# Patient Record
Sex: FEMALE | Race: OTHER | HISPANIC OR LATINO | URBAN - METROPOLITAN AREA
[De-identification: names, ages, dates, MRNs, and addresses within clinical notes are randomized per-mention and may not be internally consistent; named-entity substitution may affect disease eponyms.]

---

## 2017-03-29 ENCOUNTER — INPATIENT (INPATIENT)
Facility: HOSPITAL | Age: 42
LOS: 2 days | Discharge: HOME | End: 2017-04-01
Attending: OBSTETRICS & GYNECOLOGY | Admitting: OBSTETRICS & GYNECOLOGY
Payer: COMMERCIAL

## 2017-03-29 LAB
BASOPHILS NFR BLD AUTO: 0.3 % — SIGNIFICANT CHANGE UP (ref 0–2)
EOSINOPHIL NFR BLD AUTO: 1.7 % — SIGNIFICANT CHANGE UP (ref 0–6)
HCT VFR BLD CALC: 31.9 % — LOW (ref 34.5–45)
HGB BLD-MCNC: 10.6 G/DL — LOW (ref 11.5–15.5)
LYMPHOCYTES # BLD AUTO: 21.8 % — SIGNIFICANT CHANGE UP (ref 13–44)
MCHC RBC-ENTMCNC: 29.9 PG — SIGNIFICANT CHANGE UP (ref 27–34)
MCHC RBC-ENTMCNC: 33.2 G/DL — SIGNIFICANT CHANGE UP (ref 32–36)
MCV RBC AUTO: 90.1 FL — SIGNIFICANT CHANGE UP (ref 80–100)
MONOCYTES NFR BLD AUTO: 6 % — SIGNIFICANT CHANGE UP (ref 2–14)
NEUTROPHILS NFR BLD AUTO: 70.2 % — SIGNIFICANT CHANGE UP (ref 43–77)
PLATELET # BLD AUTO: 168 K/UL — SIGNIFICANT CHANGE UP (ref 150–400)
RBC # BLD: 3.54 M/UL — LOW (ref 3.8–5.2)
RBC # FLD: 15.6 % — SIGNIFICANT CHANGE UP (ref 10.3–16.9)
WBC # BLD: 6 K/UL — SIGNIFICANT CHANGE UP (ref 3.8–10.5)
WBC # FLD AUTO: 6 K/UL — SIGNIFICANT CHANGE UP (ref 3.8–10.5)

## 2017-03-29 RX ORDER — SODIUM CHLORIDE 9 MG/ML
1000 INJECTION, SOLUTION INTRAVENOUS
Qty: 0 | Refills: 0 | Status: DISCONTINUED | OUTPATIENT
Start: 2017-03-29 | End: 2017-03-30

## 2017-03-29 RX ORDER — SODIUM CHLORIDE 9 MG/ML
1000 INJECTION, SOLUTION INTRAVENOUS ONCE
Qty: 0 | Refills: 0 | Status: COMPLETED | OUTPATIENT
Start: 2017-03-29 | End: 2017-03-30

## 2017-03-29 RX ORDER — OXYTOCIN 10 UNIT/ML
333.33 VIAL (ML) INJECTION
Qty: 20 | Refills: 0 | Status: COMPLETED | OUTPATIENT
Start: 2017-03-29

## 2017-03-29 RX ORDER — OXYTOCIN 10 UNIT/ML
1 VIAL (ML) INJECTION
Qty: 30 | Refills: 0 | Status: DISCONTINUED | OUTPATIENT
Start: 2017-03-29 | End: 2017-04-01

## 2017-03-29 RX ORDER — CITRIC ACID/SODIUM CITRATE 300-500 MG
15 SOLUTION, ORAL ORAL EVERY 4 HOURS
Qty: 0 | Refills: 0 | Status: DISCONTINUED | OUTPATIENT
Start: 2017-03-29 | End: 2017-03-30

## 2017-03-30 VITALS — WEIGHT: 211.64 LBS | HEIGHT: 68 IN

## 2017-03-30 LAB
BLD GP AB SCN SERPL QL: NEGATIVE — SIGNIFICANT CHANGE UP
RH IG SCN BLD-IMP: POSITIVE — SIGNIFICANT CHANGE UP
T PALLIDUM AB TITR SER: NEGATIVE — SIGNIFICANT CHANGE UP

## 2017-03-30 RX ORDER — IBUPROFEN 200 MG
600 TABLET ORAL EVERY 6 HOURS
Qty: 0 | Refills: 0 | Status: DISCONTINUED | OUTPATIENT
Start: 2017-03-30 | End: 2017-04-01

## 2017-03-30 RX ORDER — HYDROCORTISONE 1 %
1 OINTMENT (GRAM) TOPICAL EVERY 4 HOURS
Qty: 0 | Refills: 0 | Status: DISCONTINUED | OUTPATIENT
Start: 2017-03-30 | End: 2017-04-01

## 2017-03-30 RX ORDER — DIBUCAINE 1 %
1 OINTMENT (GRAM) RECTAL EVERY 4 HOURS
Qty: 0 | Refills: 0 | Status: DISCONTINUED | OUTPATIENT
Start: 2017-03-30 | End: 2017-04-01

## 2017-03-30 RX ORDER — PRAMOXINE HYDROCHLORIDE 150 MG/15G
1 AEROSOL, FOAM RECTAL EVERY 4 HOURS
Qty: 0 | Refills: 0 | Status: DISCONTINUED | OUTPATIENT
Start: 2017-03-30 | End: 2017-04-01

## 2017-03-30 RX ORDER — ACETAMINOPHEN 500 MG
650 TABLET ORAL EVERY 6 HOURS
Qty: 0 | Refills: 0 | Status: DISCONTINUED | OUTPATIENT
Start: 2017-03-30 | End: 2017-04-01

## 2017-03-30 RX ORDER — MAGNESIUM HYDROXIDE 400 MG/1
30 TABLET, CHEWABLE ORAL
Qty: 0 | Refills: 0 | Status: DISCONTINUED | OUTPATIENT
Start: 2017-03-30 | End: 2017-04-01

## 2017-03-30 RX ORDER — GLYCERIN ADULT
1 SUPPOSITORY, RECTAL RECTAL AT BEDTIME
Qty: 0 | Refills: 0 | Status: DISCONTINUED | OUTPATIENT
Start: 2017-03-30 | End: 2017-04-01

## 2017-03-30 RX ORDER — OXYTOCIN 10 UNIT/ML
41.67 VIAL (ML) INJECTION
Qty: 20 | Refills: 0 | Status: DISCONTINUED | OUTPATIENT
Start: 2017-03-30 | End: 2017-04-01

## 2017-03-30 RX ORDER — DIPHENHYDRAMINE HCL 50 MG
25 CAPSULE ORAL EVERY 6 HOURS
Qty: 0 | Refills: 0 | Status: DISCONTINUED | OUTPATIENT
Start: 2017-03-30 | End: 2017-04-01

## 2017-03-30 RX ORDER — DOCUSATE SODIUM 100 MG
100 CAPSULE ORAL
Qty: 0 | Refills: 0 | Status: DISCONTINUED | OUTPATIENT
Start: 2017-03-30 | End: 2017-04-01

## 2017-03-30 RX ORDER — AER TRAVELER 0.5 G/1
1 SOLUTION RECTAL; TOPICAL EVERY 4 HOURS
Qty: 0 | Refills: 0 | Status: DISCONTINUED | OUTPATIENT
Start: 2017-03-30 | End: 2017-04-01

## 2017-03-30 RX ORDER — TETANUS TOXOID, REDUCED DIPHTHERIA TOXOID AND ACELLULAR PERTUSSIS VACCINE, ADSORBED 5; 2.5; 8; 8; 2.5 [IU]/.5ML; [IU]/.5ML; UG/.5ML; UG/.5ML; UG/.5ML
0.5 SUSPENSION INTRAMUSCULAR ONCE
Qty: 0 | Refills: 0 | Status: COMPLETED | OUTPATIENT
Start: 2017-03-30 | End: 2018-02-26

## 2017-03-30 RX ORDER — SIMETHICONE 80 MG/1
80 TABLET, CHEWABLE ORAL EVERY 6 HOURS
Qty: 0 | Refills: 0 | Status: DISCONTINUED | OUTPATIENT
Start: 2017-03-30 | End: 2017-04-01

## 2017-03-30 RX ORDER — OXYTOCIN 10 UNIT/ML
333.33 VIAL (ML) INJECTION
Qty: 20 | Refills: 0 | Status: COMPLETED | OUTPATIENT
Start: 2017-03-30 | End: 2017-03-30

## 2017-03-30 RX ORDER — LANOLIN
1 OINTMENT (GRAM) TOPICAL EVERY 6 HOURS
Qty: 0 | Refills: 0 | Status: DISCONTINUED | OUTPATIENT
Start: 2017-03-30 | End: 2017-04-01

## 2017-03-30 RX ORDER — SODIUM CHLORIDE 9 MG/ML
3 INJECTION INTRAMUSCULAR; INTRAVENOUS; SUBCUTANEOUS EVERY 8 HOURS
Qty: 0 | Refills: 0 | Status: DISCONTINUED | OUTPATIENT
Start: 2017-03-30 | End: 2017-04-01

## 2017-03-30 RX ADMIN — Medication 1000 MILLIUNIT(S)/MIN: at 12:52

## 2017-03-30 RX ADMIN — Medication 1 MILLIUNIT(S)/MIN: at 00:05

## 2017-03-30 RX ADMIN — SODIUM CHLORIDE 125 MILLILITER(S): 9 INJECTION, SOLUTION INTRAVENOUS at 00:05

## 2017-03-30 RX ADMIN — Medication 100 MILLIGRAM(S): at 22:10

## 2017-03-30 RX ADMIN — Medication 600 MILLIGRAM(S): at 22:10

## 2017-03-30 RX ADMIN — Medication 600 MILLIGRAM(S): at 23:00

## 2017-03-30 RX ADMIN — SODIUM CHLORIDE 3 MILLILITER(S): 9 INJECTION INTRAMUSCULAR; INTRAVENOUS; SUBCUTANEOUS at 22:04

## 2017-03-30 RX ADMIN — Medication 600 MILLIGRAM(S): at 16:07

## 2017-03-30 RX ADMIN — SODIUM CHLORIDE 125 MILLILITER(S): 9 INJECTION, SOLUTION INTRAVENOUS at 11:39

## 2017-03-30 RX ADMIN — SODIUM CHLORIDE 2000 MILLILITER(S): 9 INJECTION, SOLUTION INTRAVENOUS at 04:04

## 2017-03-31 RX ADMIN — Medication 600 MILLIGRAM(S): at 12:14

## 2017-03-31 RX ADMIN — Medication 1 TABLET(S): at 12:14

## 2017-03-31 RX ADMIN — Medication 600 MILLIGRAM(S): at 22:00

## 2017-03-31 RX ADMIN — PRAMOXINE HYDROCHLORIDE 1 APPLICATION(S): 150 AEROSOL, FOAM RECTAL at 01:12

## 2017-03-31 RX ADMIN — Medication 600 MILLIGRAM(S): at 06:45

## 2017-03-31 RX ADMIN — SIMETHICONE 80 MILLIGRAM(S): 80 TABLET, CHEWABLE ORAL at 21:38

## 2017-03-31 RX ADMIN — Medication 650 MILLIGRAM(S): at 01:12

## 2017-03-31 RX ADMIN — Medication 600 MILLIGRAM(S): at 12:30

## 2017-03-31 RX ADMIN — Medication 650 MILLIGRAM(S): at 02:05

## 2017-03-31 RX ADMIN — Medication 100 MILLIGRAM(S): at 21:33

## 2017-03-31 RX ADMIN — SODIUM CHLORIDE 3 MILLILITER(S): 9 INJECTION INTRAMUSCULAR; INTRAVENOUS; SUBCUTANEOUS at 05:58

## 2017-03-31 RX ADMIN — Medication 100 MILLIGRAM(S): at 12:17

## 2017-03-31 RX ADMIN — SODIUM CHLORIDE 3 MILLILITER(S): 9 INJECTION INTRAMUSCULAR; INTRAVENOUS; SUBCUTANEOUS at 21:30

## 2017-03-31 RX ADMIN — Medication 600 MILLIGRAM(S): at 05:55

## 2017-03-31 RX ADMIN — Medication 600 MILLIGRAM(S): at 21:33

## 2017-03-31 RX ADMIN — Medication 1 APPLICATION(S): at 21:33

## 2017-03-31 NOTE — PROGRESS NOTE ADULT - ASSESSMENT
A/P yo 41y s/p , PPD #1, stable  1. Pain: Motrin prn  2. GI: Reg diet  3. Encourage breastfeeding  4. DVT prophylaxis: early ambulation  5. Dispo: PP2

## 2017-03-31 NOTE — PROGRESS NOTE ADULT - SUBJECTIVE AND OBJECTIVE BOX
Patient evaluated at bedside.   She reports pain is well controlled with motrin.  She denies headache, dizziness, chest pain, palpitations, shortness of breathe, nausea, vomiting, heavy vaginal bleeding or perineal discomfort.  She has been ambulating without assistance, voiding spontaneously, and is breastfeeding.    Physical Exam:  Vital Signs Last 24 Hrs  T(C): 36.8, Max: 36.8 (03-30 @ 21:56)  T(F): 98.2, Max: 98.3 (03-30 @ 21:56)  HR: 73 (58 - 82)  BP: 97/62 (94/48 - 113/57)  BP(mean): --  RR: 18 (16 - 20)  SpO2: 97% (97% - 100%)    GA: NAD, A+0 x 3  CV: RRR  Pulm: CTAB  Breasts: soft, nontender, no palpable masses  Abd: + BS, soft, nontender, nondistended, no rebound or guarding, uterus firm at midline, at the umbilicus  : lochia WNL  Extremities: no swelling or calf tenderness, reflexes +2 bilaterally                          10.6   6.0   )-----------( 168      ( 29 Mar 2017 23:27 )             31.9

## 2017-04-01 ENCOUNTER — TRANSCRIPTION ENCOUNTER (OUTPATIENT)
Age: 42
End: 2017-04-01

## 2017-04-01 VITALS
OXYGEN SATURATION: 97 % | SYSTOLIC BLOOD PRESSURE: 105 MMHG | RESPIRATION RATE: 16 BRPM | TEMPERATURE: 98 F | DIASTOLIC BLOOD PRESSURE: 67 MMHG | HEART RATE: 82 BPM

## 2017-04-01 PROCEDURE — 86900 BLOOD TYPING SEROLOGIC ABO: CPT

## 2017-04-01 PROCEDURE — 36415 COLL VENOUS BLD VENIPUNCTURE: CPT

## 2017-04-01 PROCEDURE — 90715 TDAP VACCINE 7 YRS/> IM: CPT

## 2017-04-01 PROCEDURE — 86901 BLOOD TYPING SEROLOGIC RH(D): CPT

## 2017-04-01 PROCEDURE — 85025 COMPLETE CBC W/AUTO DIFF WBC: CPT

## 2017-04-01 PROCEDURE — 86850 RBC ANTIBODY SCREEN: CPT

## 2017-04-01 PROCEDURE — 86780 TREPONEMA PALLIDUM: CPT

## 2017-04-01 RX ORDER — TETANUS TOXOID, REDUCED DIPHTHERIA TOXOID AND ACELLULAR PERTUSSIS VACCINE, ADSORBED 5; 2.5; 8; 8; 2.5 [IU]/.5ML; [IU]/.5ML; UG/.5ML; UG/.5ML; UG/.5ML
0.5 SUSPENSION INTRAMUSCULAR ONCE
Qty: 0 | Refills: 0 | Status: COMPLETED | OUTPATIENT
Start: 2017-04-01 | End: 2017-04-01

## 2017-04-01 RX ADMIN — Medication 600 MILLIGRAM(S): at 11:55

## 2017-04-01 RX ADMIN — Medication 100 MILLIGRAM(S): at 05:51

## 2017-04-01 RX ADMIN — Medication 100 MILLIGRAM(S): at 11:55

## 2017-04-01 RX ADMIN — TETANUS TOXOID, REDUCED DIPHTHERIA TOXOID AND ACELLULAR PERTUSSIS VACCINE, ADSORBED 0.5 MILLILITER(S): 5; 2.5; 8; 8; 2.5 SUSPENSION INTRAMUSCULAR at 15:53

## 2017-04-01 RX ADMIN — Medication 600 MILLIGRAM(S): at 12:50

## 2017-04-01 RX ADMIN — SODIUM CHLORIDE 3 MILLILITER(S): 9 INJECTION INTRAMUSCULAR; INTRAVENOUS; SUBCUTANEOUS at 05:43

## 2017-04-01 RX ADMIN — Medication 1 SUPPOSITORY(S): at 05:51

## 2017-04-01 RX ADMIN — Medication 600 MILLIGRAM(S): at 06:50

## 2017-04-01 RX ADMIN — Medication 600 MILLIGRAM(S): at 05:51

## 2017-04-01 RX ADMIN — MAGNESIUM HYDROXIDE 30 MILLILITER(S): 400 TABLET, CHEWABLE ORAL at 05:51

## 2017-04-01 RX ADMIN — SODIUM CHLORIDE 3 MILLILITER(S): 9 INJECTION INTRAMUSCULAR; INTRAVENOUS; SUBCUTANEOUS at 14:11

## 2017-04-01 RX ADMIN — Medication 1 TABLET(S): at 11:55

## 2017-04-01 NOTE — DISCHARGE NOTE ADULT - CARE PROVIDER_API CALL
Mike Garcia), Obstetrics and Gynecology  [address] [place] [address]  Phone: [phone]  Fax: [phone]

## 2017-04-01 NOTE — DISCHARGE NOTE ADULT - PATIENT PORTAL LINK FT
“You can access the FollowHealth Patient Portal, offered by [facility], by registering with the following website: [email or web address]”

## 2017-04-01 NOTE — DISCHARGE NOTE OB - CARE PROVIDER_API CALL
Mike Garcia), Obstetrics and Gynecology  [address] [place] [address]  Phone: [phone]  Fax: [phone]

## 2017-04-01 NOTE — DISCHARGE NOTE ADULT - HOSPITAL COURSE
Patient admitted to L&D for vaginal delivery.  Intrapartum course uneventful.  Postpartum course uncomplicated.  Discharged home on PP Day #2.

## 2017-04-01 NOTE — DISCHARGE NOTE OB - PATIENT PORTAL LINK FT
“You can access the FollowHealth Patient Portal, offered by [facility], by registering with the following website: [email or web address]”

## 2017-04-01 NOTE — DISCHARGE NOTE ADULT - CARE PLAN
Principal Discharge DX:	Vaginal delivery  Goal:	ambulate, breastfeed, regular diet  Instructions for follow-up, activity and diet:	enjoy baby.

## 2017-04-03 DIAGNOSIS — Z3A.40 40 WEEKS GESTATION OF PREGNANCY: ICD-10-CM

## 2021-08-28 NOTE — PATIENT PROFILE OB - MEDICAL/SURG HX
Dr Rocio Montgomery notified via perfect serve that patient's blood sugar is 373 For Medical Surgical Hx obtained at Admission, Please see Provider H&P

## 2022-10-07 ENCOUNTER — EMERGENCY (EMERGENCY)
Facility: HOSPITAL | Age: 47
LOS: 1 days | Discharge: HOME | End: 2022-10-07
Attending: STUDENT IN AN ORGANIZED HEALTH CARE EDUCATION/TRAINING PROGRAM | Admitting: STUDENT IN AN ORGANIZED HEALTH CARE EDUCATION/TRAINING PROGRAM
Payer: COMMERCIAL

## 2022-10-07 VITALS
HEART RATE: 67 BPM | SYSTOLIC BLOOD PRESSURE: 103 MMHG | RESPIRATION RATE: 18 BRPM | DIASTOLIC BLOOD PRESSURE: 68 MMHG | TEMPERATURE: 98 F | OXYGEN SATURATION: 99 %

## 2022-10-07 VITALS
SYSTOLIC BLOOD PRESSURE: 105 MMHG | TEMPERATURE: 98 F | HEART RATE: 83 BPM | HEIGHT: 68 IN | OXYGEN SATURATION: 98 % | RESPIRATION RATE: 18 BRPM | DIASTOLIC BLOOD PRESSURE: 70 MMHG | WEIGHT: 160.06 LBS

## 2022-10-07 LAB
ALBUMIN SERPL ELPH-MCNC: 4.5 G/DL — SIGNIFICANT CHANGE UP (ref 3.3–5)
ALP SERPL-CCNC: 74 U/L — SIGNIFICANT CHANGE UP (ref 40–120)
ALT FLD-CCNC: 22 U/L — SIGNIFICANT CHANGE UP (ref 10–45)
ANION GAP SERPL CALC-SCNC: 13 MMOL/L — SIGNIFICANT CHANGE UP (ref 5–17)
APPEARANCE UR: ABNORMAL
APTT BLD: 28.8 SEC — SIGNIFICANT CHANGE UP (ref 27.5–35.5)
AST SERPL-CCNC: 41 U/L — HIGH (ref 10–40)
BACTERIA # UR AUTO: PRESENT /HPF
BASOPHILS # BLD AUTO: 0.03 K/UL — SIGNIFICANT CHANGE UP (ref 0–0.2)
BASOPHILS NFR BLD AUTO: 0.5 % — SIGNIFICANT CHANGE UP (ref 0–2)
BILIRUB SERPL-MCNC: 0.9 MG/DL — SIGNIFICANT CHANGE UP (ref 0.2–1.2)
BILIRUB UR-MCNC: NEGATIVE — SIGNIFICANT CHANGE UP
BUN SERPL-MCNC: 13 MG/DL — SIGNIFICANT CHANGE UP (ref 7–23)
CALCIUM SERPL-MCNC: 9.8 MG/DL — SIGNIFICANT CHANGE UP (ref 8.4–10.5)
CHLORIDE SERPL-SCNC: 103 MMOL/L — SIGNIFICANT CHANGE UP (ref 96–108)
CO2 SERPL-SCNC: 22 MMOL/L — SIGNIFICANT CHANGE UP (ref 22–31)
COLOR SPEC: ABNORMAL
CREAT SERPL-MCNC: 0.76 MG/DL — SIGNIFICANT CHANGE UP (ref 0.5–1.3)
DIFF PNL FLD: ABNORMAL
EGFR: 98 ML/MIN/1.73M2 — SIGNIFICANT CHANGE UP
EOSINOPHIL # BLD AUTO: 0.08 K/UL — SIGNIFICANT CHANGE UP (ref 0–0.5)
EOSINOPHIL NFR BLD AUTO: 1.2 % — SIGNIFICANT CHANGE UP (ref 0–6)
EPI CELLS # UR: SIGNIFICANT CHANGE UP /HPF (ref 0–5)
GLUCOSE SERPL-MCNC: 126 MG/DL — HIGH (ref 70–99)
GLUCOSE UR QL: NEGATIVE — SIGNIFICANT CHANGE UP
HCT VFR BLD CALC: 36.3 % — SIGNIFICANT CHANGE UP (ref 34.5–45)
HGB BLD-MCNC: 11.9 G/DL — SIGNIFICANT CHANGE UP (ref 11.5–15.5)
IMM GRANULOCYTES NFR BLD AUTO: 0.3 % — SIGNIFICANT CHANGE UP (ref 0–0.9)
INR BLD: 1.15 — SIGNIFICANT CHANGE UP (ref 0.88–1.16)
KETONES UR-MCNC: >=80 MG/DL
LEUKOCYTE ESTERASE UR-ACNC: NEGATIVE — SIGNIFICANT CHANGE UP
LIDOCAIN IGE QN: 21 U/L — SIGNIFICANT CHANGE UP (ref 7–60)
LYMPHOCYTES # BLD AUTO: 0.91 K/UL — LOW (ref 1–3.3)
LYMPHOCYTES # BLD AUTO: 14 % — SIGNIFICANT CHANGE UP (ref 13–44)
MCHC RBC-ENTMCNC: 28 PG — SIGNIFICANT CHANGE UP (ref 27–34)
MCHC RBC-ENTMCNC: 32.8 GM/DL — SIGNIFICANT CHANGE UP (ref 32–36)
MCV RBC AUTO: 85.4 FL — SIGNIFICANT CHANGE UP (ref 80–100)
MONOCYTES # BLD AUTO: 0.12 K/UL — SIGNIFICANT CHANGE UP (ref 0–0.9)
MONOCYTES NFR BLD AUTO: 1.8 % — LOW (ref 2–14)
NEUTROPHILS # BLD AUTO: 5.34 K/UL — SIGNIFICANT CHANGE UP (ref 1.8–7.4)
NEUTROPHILS NFR BLD AUTO: 82.2 % — HIGH (ref 43–77)
NITRITE UR-MCNC: NEGATIVE — SIGNIFICANT CHANGE UP
NRBC # BLD: 0 /100 WBCS — SIGNIFICANT CHANGE UP (ref 0–0)
PH UR: 7 — SIGNIFICANT CHANGE UP (ref 5–8)
PLATELET # BLD AUTO: 198 K/UL — SIGNIFICANT CHANGE UP (ref 150–400)
POTASSIUM SERPL-MCNC: 4.2 MMOL/L — SIGNIFICANT CHANGE UP (ref 3.5–5.3)
POTASSIUM SERPL-SCNC: 4.2 MMOL/L — SIGNIFICANT CHANGE UP (ref 3.5–5.3)
PROT SERPL-MCNC: 7 G/DL — SIGNIFICANT CHANGE UP (ref 6–8.3)
PROT UR-MCNC: 30 MG/DL
PROTHROM AB SERPL-ACNC: 13.7 SEC — HIGH (ref 10.5–13.4)
RBC # BLD: 4.25 M/UL — SIGNIFICANT CHANGE UP (ref 3.8–5.2)
RBC # FLD: 14.3 % — SIGNIFICANT CHANGE UP (ref 10.3–14.5)
RBC CASTS # UR COMP ASSIST: ABNORMAL /HPF
SARS-COV-2 RNA SPEC QL NAA+PROBE: NEGATIVE — SIGNIFICANT CHANGE UP
SODIUM SERPL-SCNC: 138 MMOL/L — SIGNIFICANT CHANGE UP (ref 135–145)
SP GR SPEC: 1.02 — SIGNIFICANT CHANGE UP (ref 1–1.03)
TROPONIN T SERPL-MCNC: 0.01 NG/ML — SIGNIFICANT CHANGE UP (ref 0–0.01)
TROPONIN T SERPL-MCNC: <0.01 NG/ML — SIGNIFICANT CHANGE UP (ref 0–0.01)
UROBILINOGEN FLD QL: 1 E.U./DL — SIGNIFICANT CHANGE UP
WBC # BLD: 6.5 K/UL — SIGNIFICANT CHANGE UP (ref 3.8–10.5)
WBC # FLD AUTO: 6.5 K/UL — SIGNIFICANT CHANGE UP (ref 3.8–10.5)
WBC UR QL: < 5 /HPF — SIGNIFICANT CHANGE UP

## 2022-10-07 PROCEDURE — 87635 SARS-COV-2 COVID-19 AMP PRB: CPT

## 2022-10-07 PROCEDURE — 71045 X-RAY EXAM CHEST 1 VIEW: CPT

## 2022-10-07 PROCEDURE — 80053 COMPREHEN METABOLIC PANEL: CPT

## 2022-10-07 PROCEDURE — 99285 EMERGENCY DEPT VISIT HI MDM: CPT | Mod: 25

## 2022-10-07 PROCEDURE — 96375 TX/PRO/DX INJ NEW DRUG ADDON: CPT

## 2022-10-07 PROCEDURE — 76705 ECHO EXAM OF ABDOMEN: CPT

## 2022-10-07 PROCEDURE — 96374 THER/PROPH/DIAG INJ IV PUSH: CPT | Mod: XU

## 2022-10-07 PROCEDURE — 36415 COLL VENOUS BLD VENIPUNCTURE: CPT

## 2022-10-07 PROCEDURE — 71045 X-RAY EXAM CHEST 1 VIEW: CPT | Mod: 26

## 2022-10-07 PROCEDURE — 93005 ELECTROCARDIOGRAM TRACING: CPT

## 2022-10-07 PROCEDURE — 83690 ASSAY OF LIPASE: CPT

## 2022-10-07 PROCEDURE — 85610 PROTHROMBIN TIME: CPT

## 2022-10-07 PROCEDURE — 74177 CT ABD & PELVIS W/CONTRAST: CPT | Mod: 26,MA

## 2022-10-07 PROCEDURE — 74177 CT ABD & PELVIS W/CONTRAST: CPT | Mod: MA

## 2022-10-07 PROCEDURE — 85025 COMPLETE CBC W/AUTO DIFF WBC: CPT

## 2022-10-07 PROCEDURE — 99285 EMERGENCY DEPT VISIT HI MDM: CPT

## 2022-10-07 PROCEDURE — 93010 ELECTROCARDIOGRAM REPORT: CPT | Mod: NC

## 2022-10-07 PROCEDURE — 84484 ASSAY OF TROPONIN QUANT: CPT

## 2022-10-07 PROCEDURE — 76705 ECHO EXAM OF ABDOMEN: CPT | Mod: 26

## 2022-10-07 PROCEDURE — 81001 URINALYSIS AUTO W/SCOPE: CPT

## 2022-10-07 PROCEDURE — 85730 THROMBOPLASTIN TIME PARTIAL: CPT

## 2022-10-07 RX ORDER — PANTOPRAZOLE SODIUM 20 MG/1
40 TABLET, DELAYED RELEASE ORAL ONCE
Refills: 0 | Status: COMPLETED | OUTPATIENT
Start: 2022-10-07 | End: 2022-10-07

## 2022-10-07 RX ORDER — PANTOPRAZOLE SODIUM 20 MG/1
1 TABLET, DELAYED RELEASE ORAL
Qty: 14 | Refills: 0
Start: 2022-10-07 | End: 2022-10-20

## 2022-10-07 RX ORDER — METOCLOPRAMIDE HCL 10 MG
10 TABLET ORAL ONCE
Refills: 0 | Status: DISCONTINUED | OUTPATIENT
Start: 2022-10-07 | End: 2022-10-07

## 2022-10-07 RX ORDER — ONDANSETRON 8 MG/1
1 TABLET, FILM COATED ORAL
Qty: 12 | Refills: 0
Start: 2022-10-07

## 2022-10-07 RX ORDER — LIDOCAINE 4 G/100G
10 CREAM TOPICAL ONCE
Refills: 0 | Status: COMPLETED | OUTPATIENT
Start: 2022-10-07 | End: 2022-10-07

## 2022-10-07 RX ORDER — FAMOTIDINE 10 MG/ML
20 INJECTION INTRAVENOUS ONCE
Refills: 0 | Status: COMPLETED | OUTPATIENT
Start: 2022-10-07 | End: 2022-10-07

## 2022-10-07 RX ORDER — MORPHINE SULFATE 50 MG/1
4 CAPSULE, EXTENDED RELEASE ORAL ONCE
Refills: 0 | Status: DISCONTINUED | OUTPATIENT
Start: 2022-10-07 | End: 2022-10-07

## 2022-10-07 RX ORDER — ONDANSETRON 8 MG/1
4 TABLET, FILM COATED ORAL ONCE
Refills: 0 | Status: COMPLETED | OUTPATIENT
Start: 2022-10-07 | End: 2022-10-07

## 2022-10-07 RX ORDER — METOCLOPRAMIDE HCL 10 MG
10 TABLET ORAL ONCE
Refills: 0 | Status: COMPLETED | OUTPATIENT
Start: 2022-10-07 | End: 2022-10-07

## 2022-10-07 RX ORDER — SODIUM CHLORIDE 9 MG/ML
1000 INJECTION INTRAMUSCULAR; INTRAVENOUS; SUBCUTANEOUS ONCE
Refills: 0 | Status: COMPLETED | OUTPATIENT
Start: 2022-10-07 | End: 2022-10-07

## 2022-10-07 RX ADMIN — ONDANSETRON 4 MILLIGRAM(S): 8 TABLET, FILM COATED ORAL at 09:51

## 2022-10-07 RX ADMIN — FAMOTIDINE 20 MILLIGRAM(S): 10 INJECTION INTRAVENOUS at 09:51

## 2022-10-07 RX ADMIN — Medication 104 MILLIGRAM(S): at 12:19

## 2022-10-07 RX ADMIN — MORPHINE SULFATE 4 MILLIGRAM(S): 50 CAPSULE, EXTENDED RELEASE ORAL at 09:51

## 2022-10-07 RX ADMIN — PANTOPRAZOLE SODIUM 40 MILLIGRAM(S): 20 TABLET, DELAYED RELEASE ORAL at 10:39

## 2022-10-07 RX ADMIN — SODIUM CHLORIDE 1000 MILLILITER(S): 9 INJECTION INTRAMUSCULAR; INTRAVENOUS; SUBCUTANEOUS at 10:03

## 2022-10-07 RX ADMIN — LIDOCAINE 10 MILLILITER(S): 4 CREAM TOPICAL at 10:43

## 2022-10-07 RX ADMIN — Medication 30 MILLILITER(S): at 10:43

## 2022-10-07 RX ADMIN — MORPHINE SULFATE 4 MILLIGRAM(S): 50 CAPSULE, EXTENDED RELEASE ORAL at 12:18

## 2022-10-07 NOTE — ED ADULT NURSE NOTE - OBJECTIVE STATEMENT
patient presents to ED for cramping pain since hours ago, complaining of vomiting and nausea, denies diarrhea, headache, chest pain. patient presents to ED for cramping pain, chest pain since hours ago, complaining of vomiting and nausea, denies diarrhea, headache.

## 2022-10-07 NOTE — ED PROVIDER NOTE - CARE PROVIDER_API CALL
Mika Monreal (MD)  Gastroenterology; Internal Medicine  [address] [place] [address]  Phone: [phone]  Fax: [phone]  Follow Up Time:

## 2022-10-07 NOTE — ED PROVIDER NOTE - NS ED ATTENDING STATEMENT MOD
This was a shared visit with the KARMEN. I reviewed and verified the documentation and independently performed the documented:

## 2022-10-07 NOTE — ED PROVIDER NOTE - OBJECTIVE STATEMENT
47 F pmh gerd p/w chest/abd pain w/ NV x 7am.  pt reports waking up with constant sharp/burning chest pain/LUQ abd pain radiating to her back w/ persistent nausea, few episodes bilious, nb emesis.  tried to take antacid but unsure if kept down 2/2 vomiting.  denies f/c, headache, dizziness, palpitations, sob, fainting, cough/uri sxs, diarrhea, constipation, urinary sxs, numbness/weakness, paresthesia, fall/trauma   no FH CAD, nonsmoker.

## 2022-10-07 NOTE — ED ADULT TRIAGE NOTE - CHIEF COMPLAINT QUOTE
BIBEMS co RUQ abdominal pain x1 hour associated with yellow emesis. Denies medical hx, no surgeries. LMP 9/25. Currently taking ozempic for weight loss.

## 2022-10-07 NOTE — ED PROVIDER NOTE - PATIENT PORTAL LINK FT
You can access the FollowMyHealth Patient Portal offered by [facility] by registering at the following website: [email or web address]. By joining [employer] [facility] FollowMyHealth portal, you will also be able to view your health information using other applications (apps) compatible with our system.

## 2022-10-07 NOTE — ED PROVIDER NOTE - ATTENDING APP SHARED VISIT CONTRIBUTION OF CARE
46 yo F hx GERD now with chest and ab pain since 7am, woke up, sharp burning pain epigastric, LUQ, radiating up to chest with nausea and 3 episodes of bilious emesis.  Vomited antacid, has had similar.  No surgical hx.  Denies smoking use, no FH CAD.  Denies numbness, weakness, tingling.  No fever, chills.  Not RUQ (despite triage).  Pt actively retching on exam, mild LUQ/epigastric ttp.  VSS.  DDx gerd gastritis pancreatitis.  EKG nonischemic, do not suspect acs.  Low likely dissection.  Plan labs, tx symptoms and reassess need for CTa.

## 2022-10-07 NOTE — ED PROVIDER NOTE - PROGRESS NOTE DETAILS
rpt trop wnl.  ct w/ nonspecific periportal edema, no other acute pathology.  pt PO chal w/ worsening pain, will obtain RUQ sonogram.  if normal w/ plan to dc w/ zofran/protonix and f/u with GI Received signout: patient with epigastric pain / nausea / vomiting, nonspecific findings on CT around portal system with plan for ultrasound for improved diagnostic clarity.  If all unremarkable will attempt PO trial, if passed can discharge home. pt able to tolerate PO, sonogram shows no acute pathology.  will dc w/ protonix/zofran and f/u with GI.  pt/family agreeable with plan

## 2022-10-07 NOTE — ED PROVIDER NOTE - NSFOLLOWUPINSTRUCTIONS_ED_ALL_ED_FT
Take protonix as prescribed for abdominal pain     Call to arrange follow up with GI doctor within one week   You may call our referrals coordinator at [phone] Monday to Friday 11am-7pm for assistance with making an appointment      Gastritis    Gastritis is soreness and swelling (inflammation) of the lining of the stomach. Gastritis can develop as a sudden onset (acute) or long-term (chronic) condition. If gastritis is not treated, it can lead to stomach bleeding and ulcers. Causes include viral and bacterial infections, excessive alcohol consumption, tobacco use, or certain medications. Symptoms include nausea, vomiting, or abdominal pain or burning especially after eating. Avoid foods or drinks that make your symptoms worse such as caffeine, chocolate, spicy foods, acidic foods, or alcohol.    SEEK IMMEDIATE MEDICAL CARE IF YOU HAVE ANY OF THE FOLLOWING SYMPTOMS: black or bloody stools, blood or coffee-ground-colored vomitus, worsening abdominal pain, fever, or inability to keep fluids down. Take protonix as prescribed for abdominal pain and zofran as needed for nausea     Call to arrange follow up with GI doctor within one week   You may call our referrals coordinator at [phone] Monday to Friday 11am-7pm for assistance with making an appointment      Gastritis    Gastritis is soreness and swelling (inflammation) of the lining of the stomach. Gastritis can develop as a sudden onset (acute) or long-term (chronic) condition. If gastritis is not treated, it can lead to stomach bleeding and ulcers. Causes include viral and bacterial infections, excessive alcohol consumption, tobacco use, or certain medications. Symptoms include nausea, vomiting, or abdominal pain or burning especially after eating. Avoid foods or drinks that make your symptoms worse such as caffeine, chocolate, spicy foods, acidic foods, or alcohol.    SEEK IMMEDIATE MEDICAL CARE IF YOU HAVE ANY OF THE FOLLOWING SYMPTOMS: black or bloody stools, blood or coffee-ground-colored vomitus, worsening abdominal pain, fever, or inability to keep fluids down.      Food Choices for Gastroesophageal Reflux Disease, Adult      When you have gastroesophageal reflux disease (GERD), the foods you eat and your eating habits are very important. Choosing the right foods can help ease the discomfort of GERD. Consider working with a dietitian to help you make healthy food choices.      What are tips for following this plan?    Reading food labels     •Look for foods that are low in saturated fat. Foods that have less than 5% of daily value (DV) of fat and 0 g of trans fats may help with your symptoms.      Cooking     •Cook foods using methods other than frying. This may include baking, steaming, grilling, or broiling. These are all methods that do not need a lot of fat for cooking.      •To add flavor, try to use herbs that are low in spice and acidity.        Meal planning      •Choose healthy foods that are low in fat, such as fruits, vegetables, whole grains, low-fat dairy products, lean meats, fish, and poultry.      •Eat frequent, small meals instead of three large meals each day. Eat your meals slowly, in a relaxed setting. Avoid bending over or lying down until 2–3 hours after eating.      •Limit high-fat foods such as fatty meats or fried foods.      •Limit your intake of fatty foods, such as oils, butter, and shortening.    •Avoid the following as told by your health care provider:  •Foods that cause symptoms. These may be different for different people. Keep a food diary to keep track of foods that cause symptoms.      •Alcohol.      •Drinking large amounts of liquid with meals.      •Eating meals during the 2–3 hours before bed.        Lifestyle     •Maintain a healthy weight. Ask your health care provider what weight is healthy for you. If you need to lose weight, work with your health care provider to do so safely.      •Exercise for at least 30 minutes on 5 or more days each week, or as told by your health care provider.      •Avoid wearing clothes that fit tightly around your waist and chest.      • Do not use any products that contain nicotine or tobacco. These products include cigarettes, chewing tobacco, and vaping devices, such as e-cigarettes. If you need help quitting, ask your health care provider.      •Sleep with the head of your bed raised. Use a wedge under the mattress or blocks under the bed frame to raise the head of the bed.      •Chew sugar-free gum after mealtimes.        What foods should I eat?     Eat a healthy, well-balanced diet of fruits, vegetables, whole grains, low-fat dairy products, lean meats, fish, and poultry. Each person is different. Foods that may trigger symptoms in one person may not trigger any symptoms in another person. Work with your health care provider to identify foods that are safe for you.    The items listed above may not be a complete list of recommended foods and beverages. Contact a dietitian for more information.       What foods should I avoid?    Limiting some of these foods may help manage the symptoms of GERD. Everyone is different. Consult a dietitian or your health care provider to help you identify the exact foods to avoid, if any.    Fruits     Any fruits prepared with added fat. Any fruits that cause symptoms. For some people this may include citrus fruits, such as oranges, grapefruit, pineapple, and ester.    Vegetables     Deep-fried vegetables. French fries. Any vegetables prepared with added fat. Any vegetables that cause symptoms. For some people, this may include tomatoes and tomato products, chili peppers, onions and garlic, and horseradish.    Grains     Pastries or quick breads with added fat.    Meats and other proteins     High-fat meats, such as fatty beef or pork, hot dogs, ribs, ham, sausage, salami, and zacarias. Fried meat or protein, including fried fish and fried chicken. Nuts and nut butters, in large amounts.    Dairy     Whole milk and chocolate milk. Sour cream. Cream. Ice cream. Cream cheese. Milkshakes.    Fats and oils     Butter. Margarine. Shortening. Ghee.    Beverages     Coffee and tea, with or without caffeine. Carbonated beverages. Sodas. Energy drinks. Fruit juice made with acidic fruits, such as orange or grapefruit. Tomato juice. Alcoholic drinks.    Sweets and desserts     Chocolate and cocoa. Donuts.    Seasonings and condiments     Pepper. Peppermint and spearmint. Added salt. Any condiments, herbs, or seasonings that cause symptoms. For some people, this may include lao, hot sauce, or vinegar-based salad dressings.    The items listed above may not be a complete list of foods and beverages to avoid. Contact a dietitian for more information.       Questions to ask your health care provider    Diet and lifestyle changes are usually the first steps that are taken to manage symptoms of GERD. If diet and lifestyle changes do not improve your symptoms, talk with your health care provider about taking medicines.      Where to find more information    •International Foundation for Gastrointestinal Disorders: aboutgerd.org        Summary    •When you have gastroesophageal reflux disease (GERD), food and lifestyle choices may be very helpful in easing the discomfort of GERD.      •Eat frequent, small meals instead of three large meals each day. Eat your meals slowly, in a relaxed setting. Avoid bending over or lying down until 2–3 hours after eating.      •Limit high-fat foods such as fatty meats or fried foods.      This information is not intended to replace advice given to you by your health care provider. Make sure you discuss any questions you have with your health care provider.

## 2022-10-07 NOTE — ED PROVIDER NOTE - CARE PROVIDERS DIRECT ADDRESSES
[email or web address]

## 2022-10-07 NOTE — ED PROVIDER NOTE - PHYSICAL EXAMINATION
Vitals reviewed  Gen: uncomfortable appearing, retching, no hypoxia/dyspnea  Skin: wwp, no rash/lesions  HEENT: ncat, eomi, mmm  Neck: no JVD  CV: rrr, no audible m/r/g  Chest: no chest wall ttp  Resp: symmetrical expansion, ctab, no w/r/r  Abd: nondistended, soft, + epigastric and LUQ ttp, no r/g, no cvat   Ext: FROM throughout, no peripheral edema/calf ttp, distal pulses 2+, SILT   Neuro: alert/oriented, no focal deficits, steady gait

## 2022-10-07 NOTE — ED ADULT NURSE REASSESSMENT NOTE - NS ED NURSE REASSESS COMMENT FT1
pt received from [facility] RN, pt c/o upper abd pain radiating to chest, tearful. DIMA Kyle assessed pt at bedside- ordered lab studies and medications- will reassess pain

## 2022-10-07 NOTE — ED PROVIDER NOTE - CLINICAL SUMMARY MEDICAL DECISION MAKING FREE TEXT BOX
47 F pmh gerd p/w chest/abd pain w/ NV x 7am.  on exam vss, afebrile, uncomfortable appearing, retching, hrrr, lungs ctab, Abd: nondistended, soft, + epigastric and LUQ ttp, no r/g, no cvat; 2+ distal pulses and SILT.  likely gastritis vs pancreatitis, r/o acs, do not suspect dissection but will obtain b/l BPs, labs, ekg, cxr, give morphine/pepcid/zofran and ivf and reassess

## 2022-10-07 NOTE — ED PROVIDER NOTE - PROVIDER TOKENS
Discharge Summary - Hand Therapy    Patient did not return to therapy after the initial evaluation.  We will assume that patient's goals were met.    D/C from [facility].     PROVIDER:[TOKEN:[[id number]]]

## 2022-10-10 DIAGNOSIS — R11.2 NAUSEA WITH VOMITING, UNSPECIFIED: ICD-10-CM

## 2022-10-10 DIAGNOSIS — R07.9 CHEST PAIN, UNSPECIFIED: ICD-10-CM

## 2022-10-10 DIAGNOSIS — R10.13 EPIGASTRIC PAIN: ICD-10-CM

## 2022-10-10 DIAGNOSIS — R10.12 LEFT UPPER QUADRANT PAIN: ICD-10-CM

## 2022-10-10 DIAGNOSIS — K21.9 GASTRO-ESOPHAGEAL REFLUX DISEASE WITHOUT ESOPHAGITIS: ICD-10-CM

## 2022-10-10 DIAGNOSIS — Z20.822 CONTACT WITH AND (SUSPECTED) EXPOSURE TO COVID-19: ICD-10-CM

## 2023-04-12 NOTE — DISCHARGE NOTE OB - MEDICATION SUMMARY - MEDICATIONS TO STOP TAKING
Intensity (Optional): 109% Were Photos Taken?: Yes Suction Settings: The suction settings were per protocol. Time (Minutes - Will Only Render If Nonzero): 0 Hifem Intensity (Optional): 100% Location 1: central abdomen Intro: Prior to treatment, the area was cleaned with alcohol and marked out with a marking pen. The gel sheet was then applied uniformly. The applicator was applied to the skin with good contact and suction. Time (Minutes - Will Only Render If Nonzero): 30 Location 2: buttocks Consent: Written consent obtained, risks reviewed including, but not limited to, blistering from suction, darker or lighter pigmentary change, bruising, and/or need for multiple treatments. Device: [employer] Post Treatment: After treatment, the suction was turned off, and the applicator was removed from the skin. Detail Level: Zone I will STOP taking the medications listed below when I get home from the hospital:  None

## 2023-04-27 NOTE — PATIENT PROFILE OB - NS PRO AD NO ADVANCE DIRECTIVE
5-325 mg tabs q12h prn              Adjust dose of present analgesic: No              Switch analgesics: No              Add/Adjust concomitant therapy: Lidocaine, Tylenol, Narcan, Lyrica throug rheumatology    I will continue her current medication regimen  which is part of the above treatment schedule. It has been helping with Ms. Lorena Mixon chronic  medical problems which for this visit include:   Diagnoses of Chronic pain syndrome, Arthritis pain of shoulder right severe, Cervical disc disorder at C4-C5 level with radiculopathy, DDD (degenerative disc disease), cervical, DDD (degenerative disc disease), lumbar, Sciatica of left side, Spinal stenosis of lumbar region without neurogenic claudication, Synovial cyst of lumbar spine, Bilateral chronic knee pain, Rheumatoid arthritis involving multiple sites with positive rheumatoid factor ([address] and Chronic respiratory failure with hypoxia ([facility] [address] were pertinent to this visit. Risks and benefits of the medications and other alternative treatments  including no treatment were discussed with the patient. The common side effects of these medications were also explained to the patient. Informed verbal consent was obtained. Goals of current treatment regimen include improvement in pain, restoration of functioning- with focus on improvement in physical performance, general activity, work or disability,emotional distress, health care utilization and  decreased medication consumption. Will continue to monitor progress towards achieving/maintaining therapeutic goals with special emphasis on  1. Improvement in perceived interfernce  of pain with ADL's. Ability to do home exercises independently. Ability to do household chores indoor and/or outdoor work and social and leisure activities. Improve psychosocial and physical functioning. - she is showing progression towards this treatment goal with the current regimen.      She was advised against drinking alcohol with the narcotic
No
